# Patient Record
Sex: MALE | Race: WHITE | HISPANIC OR LATINO | Employment: FULL TIME | URBAN - METROPOLITAN AREA
[De-identification: names, ages, dates, MRNs, and addresses within clinical notes are randomized per-mention and may not be internally consistent; named-entity substitution may affect disease eponyms.]

---

## 2017-01-24 ENCOUNTER — ALLSCRIPTS OFFICE VISIT (OUTPATIENT)
Dept: OTHER | Facility: OTHER | Age: 42
End: 2017-01-24

## 2018-01-12 VITALS — SYSTOLIC BLOOD PRESSURE: 138 MMHG | WEIGHT: 223 LBS | BODY MASS INDEX: 31.32 KG/M2 | DIASTOLIC BLOOD PRESSURE: 70 MMHG

## 2019-02-11 ENCOUNTER — TELEPHONE (OUTPATIENT)
Dept: UROLOGY | Facility: MEDICAL CENTER | Age: 44
End: 2019-02-11

## 2019-02-11 NOTE — TELEPHONE ENCOUNTER
Reason for appointment/Complaint/Diagnosis : Mirela Bella 61: [address] [age]  If yes, what kind? N/A    Previous urologist?     no                  Records requested/where?  no    Outside testing/where? no    Location Preference for office visit?  [facility]

## 2019-03-22 ENCOUNTER — OFFICE VISIT (OUTPATIENT)
Dept: UROLOGY | Facility: AMBULATORY SURGERY CENTER | Age: 44
End: 2019-03-22
Payer: COMMERCIAL

## 2019-03-22 VITALS
HEART RATE: 68 BPM | SYSTOLIC BLOOD PRESSURE: 108 MMHG | BODY MASS INDEX: 31.18 KG/M2 | HEIGHT: 72 IN | WEIGHT: 230.2 LBS | DIASTOLIC BLOOD PRESSURE: 70 MMHG

## 2019-03-22 DIAGNOSIS — Z30.2 ENCOUNTER FOR STERILIZATION: Primary | ICD-10-CM

## 2019-03-22 PROCEDURE — 99244 OFF/OP CNSLTJ NEW/EST MOD 40: CPT | Performed by: UROLOGY

## 2019-03-22 RX ORDER — LORAZEPAM 2 MG/1
2 TABLET ORAL
Qty: 1 TABLET | Refills: 0 | Status: SHIPPED | OUTPATIENT
Start: 2019-03-22 | End: 2019-03-22

## 2019-03-22 RX ORDER — DIAZEPAM 5 MG/1
10 TABLET ORAL ONCE
Qty: 1 TABLET | Refills: 0 | Status: SHIPPED | OUTPATIENT
Start: 2019-03-22 | End: 2019-04-29 | Stop reason: ALTCHOICE

## 2019-03-22 NOTE — PROGRESS NOTES
3/22/2019    Tammy Albright  1975  [id number]        Assessment  Elective sterilization    Plan  We discussed our vasectomy packet in detail  He understands the indications, risks, and benefits of the procedure  He understands that this is generally considered a permanent procedure although there is a method for reversal, it is not guaranteed to work and would not be covered by insurance  He understands that he would not be considered sterile until negative semen analysis is obtained post procedure  He further understands that he must rest, ice, and elevate the scrotum for at least 48 hours to avoid complications such as hematoma  Consent was obtained in the office today  All of his questions were answered  He was given a prescription for Valium 10 mg at his request to be taken prior to the visit  History of Present Illness  Tanisha Perera is a 44 y o  male requesting elective sterilization  His wife is pregnant with her fourth child, which was an accidental pregnancy  They both interested in permanent sterilization  She has some GYN issues and did not want to proceed with tubal ligation  Patient denies urologic history, but reports significant anxiety and is concerned about being awake during the procedure  Review of Systems  Review of Systems   Constitutional: Negative  HENT: Negative  Respiratory: Negative  Cardiovascular: Negative  Gastrointestinal: Negative  Genitourinary:        As per HPI   Musculoskeletal: Negative  Skin: Negative  Neurological: Negative  Hematological: Negative  Past Medical History  History reviewed  No pertinent past medical history      Past Social History  Past Surgical History:   Procedure Laterality Date    COLONOSCOPY  2017    WISDOM TOOTH EXTRACTION         Past Family History  Family History   Problem Relation Age of Onset    Diabetes Father     Diabetes Mother        Past Social history  Social History     Socioeconomic History    Marital status: /Civil Union     Spouse name: Not on file    Number of children: Not on file    Years of education: Not on file    Highest education level: Not on file   Occupational History    Not on file   Social Needs    Financial resource strain: Not on file    Food insecurity:     Worry: Not on file     Inability: Not on file    Transportation needs:     Medical: Not on file     Non-medical: Not on file   Tobacco Use    Smoking status: Never Smoker    Smokeless tobacco: Never Used   Substance and Sexual Activity    Alcohol use: Yes     Frequency: 2-4 times a month     Comment: socially saturday     Drug use: Never    Sexual activity: Not on file   Lifestyle    Physical activity:     Days per week: Not on file     Minutes per session: Not on file    Stress: Not on file   Relationships    Social connections:     Talks on phone: Not on file     Gets together: Not on file     Attends Christian service: Not on file     Active member of club or organization: Not on file     Attends meetings of clubs or organizations: Not on file     Relationship status: Not on file    Intimate partner violence:     Fear of current or ex partner: Not on file     Emotionally abused: Not on file     Physically abused: Not on file     Forced sexual activity: Not on file   Other Topics Concern    Not on file   Social History Narrative    Not on file     Social History     Tobacco Use   Smoking Status Never Smoker   Smokeless Tobacco Never Used       Current Medications  Current Outpatient Medications   Medication Sig Dispense Refill    diazepam (VALIUM) 5 mg tablet Take 2 tablets (10 mg total) by mouth once for 1 dose 1 hour prior to procedure 1 tablet 0     No current facility-administered medications for this visit  Allergies  No Known Allergies    Past Medical History, Social History, Family History, medications and allergies were reviewed      Vitals  Vitals:    03/22/19 1509   BP: 108/70 BP Location: Left arm   Patient Position: Sitting   Cuff Size: Adult   Pulse: 68   Weight: 104 kg (230 lb 3 2 oz)   Height: 5' 11 75" (1 822 m)       Physical Exam  Physical Exam   Constitutional: He is oriented to person, place, and time  He appears well-developed and well-nourished  Cardiovascular: Normal rate  Pulmonary/Chest: Effort normal    Abdominal: Soft  Genitourinary:   Genitourinary Comments: Bilateral testes and Vasa palpable and normal    Musculoskeletal: Normal range of motion  Neurological: He is alert and oriented to person, place, and time  Skin: Skin is warm, dry and intact  Psychiatric: He has a normal mood and affect  Vitals reviewed          Results  No results found for: PSA  Lab Results   Component Value Date    GLUCOSE 131 06/18/2014    CALCIUM 9 6 06/18/2014     06/18/2014    K 4 5 06/18/2014    CO2 29 06/18/2014     06/18/2014    BUN 19 06/18/2014    CREATININE 1 41 (H) 06/18/2014     Lab Results   Component Value Date    WBC 6 80 06/18/2014    HGB 16 6 06/18/2014    HCT 49 3 06/18/2014    MCV 92 06/18/2014     06/18/2014

## 2019-04-29 ENCOUNTER — OFFICE VISIT (OUTPATIENT)
Dept: FAMILY MEDICINE CLINIC | Facility: CLINIC | Age: 44
End: 2019-04-29
Payer: COMMERCIAL

## 2019-04-29 VITALS
SYSTOLIC BLOOD PRESSURE: 126 MMHG | BODY MASS INDEX: 30.48 KG/M2 | HEIGHT: 72 IN | TEMPERATURE: 97.6 F | DIASTOLIC BLOOD PRESSURE: 72 MMHG | WEIGHT: 225 LBS

## 2019-04-29 DIAGNOSIS — D49.2 SKIN NEOPLASM: Primary | ICD-10-CM

## 2019-04-29 PROCEDURE — 99213 OFFICE O/P EST LOW 20 MIN: CPT | Performed by: FAMILY MEDICINE

## 2019-04-29 PROCEDURE — 3008F BODY MASS INDEX DOCD: CPT | Performed by: FAMILY MEDICINE

## 2019-05-20 ENCOUNTER — PROCEDURE VISIT (OUTPATIENT)
Dept: UROLOGY | Facility: AMBULATORY SURGERY CENTER | Age: 44
End: 2019-05-20
Payer: COMMERCIAL

## 2019-05-20 VITALS
HEIGHT: 72 IN | HEART RATE: 80 BPM | SYSTOLIC BLOOD PRESSURE: 102 MMHG | WEIGHT: 219 LBS | DIASTOLIC BLOOD PRESSURE: 80 MMHG | BODY MASS INDEX: 29.66 KG/M2

## 2019-05-20 DIAGNOSIS — Z30.2 ENCOUNTER FOR STERILIZATION: Primary | ICD-10-CM

## 2019-05-20 PROCEDURE — 88302 TISSUE EXAM BY PATHOLOGIST: CPT | Performed by: PATHOLOGY

## 2019-05-20 PROCEDURE — 55250 REMOVAL OF SPERM DUCT(S): CPT | Performed by: UROLOGY

## 2019-05-20 RX ORDER — HYDROCODONE BITARTRATE AND ACETAMINOPHEN 5; 325 MG/1; MG/1
1 TABLET ORAL EVERY 6 HOURS PRN
Qty: 5 TABLET | Refills: 0 | Status: SHIPPED | OUTPATIENT
Start: 2019-05-20 | End: 2019-09-25 | Stop reason: ALTCHOICE

## 2019-06-03 ENCOUNTER — OFFICE VISIT (OUTPATIENT)
Dept: UROLOGY | Facility: AMBULATORY SURGERY CENTER | Age: 44
End: 2019-06-03

## 2019-06-03 VITALS
HEART RATE: 88 BPM | HEIGHT: 72 IN | WEIGHT: 221 LBS | DIASTOLIC BLOOD PRESSURE: 74 MMHG | SYSTOLIC BLOOD PRESSURE: 106 MMHG | BODY MASS INDEX: 29.93 KG/M2

## 2019-06-03 DIAGNOSIS — Z98.52 STATUS POST VASECTOMY: Primary | ICD-10-CM

## 2019-06-03 PROCEDURE — 99024 POSTOP FOLLOW-UP VISIT: CPT | Performed by: NURSE PRACTITIONER

## 2019-09-25 ENCOUNTER — OFFICE VISIT (OUTPATIENT)
Dept: FAMILY MEDICINE CLINIC | Facility: CLINIC | Age: 44
End: 2019-09-25
Payer: COMMERCIAL

## 2019-09-25 VITALS
WEIGHT: 225 LBS | HEART RATE: 89 BPM | SYSTOLIC BLOOD PRESSURE: 126 MMHG | TEMPERATURE: 97.4 F | DIASTOLIC BLOOD PRESSURE: 74 MMHG | BODY MASS INDEX: 30.48 KG/M2 | HEIGHT: 72 IN

## 2019-09-25 DIAGNOSIS — Z00.00 WELL ADULT EXAM: Primary | ICD-10-CM

## 2019-09-25 PROCEDURE — 86580 TB INTRADERMAL TEST: CPT

## 2019-09-25 PROCEDURE — 90715 TDAP VACCINE 7 YRS/> IM: CPT

## 2019-09-25 PROCEDURE — 90471 IMMUNIZATION ADMIN: CPT

## 2019-09-25 PROCEDURE — 99396 PREV VISIT EST AGE 40-64: CPT | Performed by: FAMILY MEDICINE

## 2019-09-25 NOTE — PROGRESS NOTES
Assessment/Plan:  Recommend laboratory studies  Patient will have Adacel shot at this time  Patient up-to-date with colonoscopy  Follow-up yearly       Diagnoses and all orders for this visit:    Well adult exam  -     CBC and differential; Future  -     Comprehensive metabolic panel; Future  -     Lipid panel; Future  -     TSH, 3rd generation with Free T4 reflex; Future            Subjective:        Patient ID: Nnamdi Amado is a 38 y o  male  Patient is here for wellness exam   Patient will need PPD for work  Patient had colonoscopy roughly 5 years ago  No chest pain or shortness of breath problems urinating defecating  No headaches or visual disturbance  No other significant complaints  The following portions of the patient's history were reviewed and updated as appropriate: allergies, current medications, past family history, past medical history, past social history, past surgical history and problem list       Review of Systems   Constitutional: Negative  HENT: Negative  Eyes: Negative  Respiratory: Negative  Cardiovascular: Negative  Gastrointestinal: Negative  Endocrine: Negative  Genitourinary: Negative  Musculoskeletal: Negative  Skin: Negative  Allergic/Immunologic: Negative  Neurological: Negative  Hematological: Negative  Psychiatric/Behavioral: Negative  Objective:      BMI Counseling: Body mass index is 30 73 kg/m²  Discussed the patient's BMI with him  The BMI is above normal  Nutrition recommendations include decreasing overall calorie intake  /74 (BP Location: Right arm)   Pulse 89   Temp (!) 97 4 °F (36 3 °C)   Ht 5' 11 75" (1 822 m)   Wt 102 kg (225 lb)   BMI 30 73 kg/m²          Physical Exam   Constitutional: He is oriented to person, place, and time  He appears well-developed and well-nourished  No distress  HENT:   Head: Normocephalic     Right Ear: External ear normal    Left Ear: External ear normal  Mouth/Throat: Oropharynx is clear and moist  No oropharyngeal exudate  Eyes: Pupils are equal, round, and reactive to light  EOM are normal  Right eye exhibits no discharge  Left eye exhibits no discharge  No scleral icterus  Neck: Normal range of motion  Neck supple  No thyromegaly present  Cardiovascular: Normal rate, regular rhythm, normal heart sounds and intact distal pulses  Exam reveals no gallop and no friction rub  No murmur heard  Pulmonary/Chest: Effort normal and breath sounds normal  No respiratory distress  He has no wheezes  He has no rales  He exhibits no tenderness  Abdominal: Soft  Bowel sounds are normal  He exhibits no distension  There is no tenderness  There is no rebound and no guarding  Musculoskeletal: Normal range of motion  He exhibits no edema or tenderness  Lymphadenopathy:     He has no cervical adenopathy  Neurological: He is oriented to person, place, and time  No cranial nerve deficit  He exhibits normal muscle tone  Coordination normal    Skin: Skin is warm and dry  No rash noted  He is not diaphoretic  No erythema  No pallor  Psychiatric: He has a normal mood and affect  His behavior is normal  Judgment and thought content normal    Nursing note and vitals reviewed

## 2019-09-27 ENCOUNTER — APPOINTMENT (OUTPATIENT)
Dept: RADIOLOGY | Facility: MEDICAL CENTER | Age: 44
End: 2019-09-27
Payer: COMMERCIAL

## 2019-09-27 ENCOUNTER — CLINICAL SUPPORT (OUTPATIENT)
Dept: FAMILY MEDICINE CLINIC | Facility: CLINIC | Age: 44
End: 2019-09-27

## 2019-09-27 DIAGNOSIS — R76.11 POSITIVE PPD: ICD-10-CM

## 2019-09-27 DIAGNOSIS — R76.11 POSITIVE PPD: Primary | ICD-10-CM

## 2019-09-27 LAB
INDURATION: ABNORMAL MM
TB SKIN TEST: POSITIVE

## 2019-09-27 PROCEDURE — 71046 X-RAY EXAM CHEST 2 VIEWS: CPT

## 2019-10-10 ENCOUNTER — APPOINTMENT (OUTPATIENT)
Dept: LAB | Facility: OTHER | Age: 44
End: 2019-10-10
Payer: COMMERCIAL

## 2019-10-10 ENCOUNTER — TRANSCRIBE ORDERS (OUTPATIENT)
Dept: LAB | Facility: OTHER | Age: 44
End: 2019-10-10

## 2019-10-10 DIAGNOSIS — Z00.00 WELL ADULT EXAM: ICD-10-CM

## 2019-10-10 LAB
ALBUMIN SERPL BCP-MCNC: 4.7 G/DL (ref 3.5–5)
ALP SERPL-CCNC: 105 U/L (ref 46–116)
ALT SERPL W P-5'-P-CCNC: 36 U/L (ref 12–78)
ANION GAP SERPL CALCULATED.3IONS-SCNC: 7 MMOL/L (ref 4–13)
AST SERPL W P-5'-P-CCNC: 18 U/L (ref 5–45)
BASOPHILS # BLD AUTO: 0.05 THOUSANDS/ΜL (ref 0–0.1)
BASOPHILS NFR BLD AUTO: 1 % (ref 0–1)
BILIRUB SERPL-MCNC: 0.51 MG/DL (ref 0.2–1)
BUN SERPL-MCNC: 20 MG/DL (ref 5–25)
CALCIUM SERPL-MCNC: 8.8 MG/DL (ref 8.3–10.1)
CHLORIDE SERPL-SCNC: 107 MMOL/L (ref 100–108)
CHOLEST SERPL-MCNC: 192 MG/DL (ref 50–200)
CO2 SERPL-SCNC: 25 MMOL/L (ref 21–32)
CREAT SERPL-MCNC: 1.41 MG/DL (ref 0.6–1.3)
EOSINOPHIL # BLD AUTO: 0.15 THOUSAND/ΜL (ref 0–0.61)
EOSINOPHIL NFR BLD AUTO: 2 % (ref 0–6)
ERYTHROCYTE [DISTWIDTH] IN BLOOD BY AUTOMATED COUNT: 12.5 % (ref 11.6–15.1)
GFR SERPL CREATININE-BSD FRML MDRD: 61 ML/MIN/1.73SQ M
GLUCOSE P FAST SERPL-MCNC: 92 MG/DL (ref 65–99)
HCT VFR BLD AUTO: 50.2 % (ref 36.5–49.3)
HDLC SERPL-MCNC: 29 MG/DL (ref 40–60)
HGB BLD-MCNC: 16.8 G/DL (ref 12–17)
IMM GRANULOCYTES # BLD AUTO: 0.06 THOUSAND/UL (ref 0–0.2)
IMM GRANULOCYTES NFR BLD AUTO: 1 % (ref 0–2)
LYMPHOCYTES # BLD AUTO: 1.77 THOUSANDS/ΜL (ref 0.6–4.47)
LYMPHOCYTES NFR BLD AUTO: 25 % (ref 14–44)
MCH RBC QN AUTO: 30.2 PG (ref 26.8–34.3)
MCHC RBC AUTO-ENTMCNC: 33.5 G/DL (ref 31.4–37.4)
MCV RBC AUTO: 90 FL (ref 82–98)
MONOCYTES # BLD AUTO: 0.69 THOUSAND/ΜL (ref 0.17–1.22)
MONOCYTES NFR BLD AUTO: 10 % (ref 4–12)
NEUTROPHILS # BLD AUTO: 4.47 THOUSANDS/ΜL (ref 1.85–7.62)
NEUTS SEG NFR BLD AUTO: 61 % (ref 43–75)
NONHDLC SERPL-MCNC: 163 MG/DL
NRBC BLD AUTO-RTO: 0 /100 WBCS
PLATELET # BLD AUTO: 191 THOUSANDS/UL (ref 149–390)
PMV BLD AUTO: 12.7 FL (ref 8.9–12.7)
POTASSIUM SERPL-SCNC: 4.3 MMOL/L (ref 3.5–5.3)
PROT SERPL-MCNC: 8 G/DL (ref 6.4–8.2)
RBC # BLD AUTO: 5.56 MILLION/UL (ref 3.88–5.62)
SODIUM SERPL-SCNC: 139 MMOL/L (ref 136–145)
TRIGL SERPL-MCNC: 461 MG/DL
TSH SERPL DL<=0.05 MIU/L-ACNC: 2.24 UIU/ML (ref 0.36–3.74)
WBC # BLD AUTO: 7.19 THOUSAND/UL (ref 4.31–10.16)

## 2019-10-10 PROCEDURE — 84443 ASSAY THYROID STIM HORMONE: CPT

## 2019-10-10 PROCEDURE — 80053 COMPREHEN METABOLIC PANEL: CPT

## 2019-10-10 PROCEDURE — 85025 COMPLETE CBC W/AUTO DIFF WBC: CPT

## 2019-10-10 PROCEDURE — 36415 COLL VENOUS BLD VENIPUNCTURE: CPT

## 2019-10-10 PROCEDURE — 80061 LIPID PANEL: CPT

## 2019-10-18 ENCOUNTER — OFFICE VISIT (OUTPATIENT)
Dept: FAMILY MEDICINE CLINIC | Facility: CLINIC | Age: 44
End: 2019-10-18
Payer: COMMERCIAL

## 2019-10-18 VITALS
DIASTOLIC BLOOD PRESSURE: 68 MMHG | HEIGHT: 72 IN | WEIGHT: 225 LBS | BODY MASS INDEX: 30.48 KG/M2 | SYSTOLIC BLOOD PRESSURE: 124 MMHG

## 2019-10-18 DIAGNOSIS — E78.1 HYPERTRIGLYCERIDEMIA: Primary | ICD-10-CM

## 2019-10-18 PROCEDURE — 99213 OFFICE O/P EST LOW 20 MIN: CPT | Performed by: FAMILY MEDICINE

## 2019-10-18 PROCEDURE — 3008F BODY MASS INDEX DOCD: CPT | Performed by: FAMILY MEDICINE

## 2019-10-18 NOTE — PROGRESS NOTES
Assessment/Plan:  Labs discussed with the patient  Patient will modify diet  Patient will exercise  Patient have labs prior to next visit in 6 months       Diagnoses and all orders for this visit:    Hypertriglyceridemia  -     Lipid panel; Future            Subjective:        Patient ID: Sarah Briceño is a 38 y o  male  Patient follow-up on laboratory results  Patient did have TB in the past as a child  A recent chest x-ray negative  No cough, sputum production, hemoptysis weight loss fatigue the  No chest pain or shortness of breath  The following portions of the patient's history were reviewed and updated as appropriate: allergies, current medications, past family history, past medical history, past social history, past surgical history and problem list       Review of Systems   Constitutional: Negative  HENT: Negative  Eyes: Negative  Respiratory: Negative  Cardiovascular: Negative  Gastrointestinal: Negative  Endocrine: Negative  Genitourinary: Negative  Musculoskeletal: Negative  Skin: Negative  Allergic/Immunologic: Negative  Neurological: Negative  Hematological: Negative  Psychiatric/Behavioral: Negative  Objective:      BMI Counseling: Body mass index is 30 73 kg/m²  Discussed the patient's BMI with him  The BMI is above normal  Nutrition recommendations include reducing portion sizes  /68 (BP Location: Right arm)   Ht 5' 11 75" (1 822 m)   Wt 102 kg (225 lb)   BMI 30 73 kg/m²          Physical Exam   Constitutional: He appears well-developed and well-nourished  Cardiovascular: Normal rate, regular rhythm and normal heart sounds  Pulmonary/Chest: Effort normal and breath sounds normal    Neurological: He is alert  Nursing note and vitals reviewed

## 2021-05-19 ENCOUNTER — OFFICE VISIT (OUTPATIENT)
Dept: FAMILY MEDICINE CLINIC | Facility: CLINIC | Age: 46
End: 2021-05-19
Payer: COMMERCIAL

## 2021-05-19 VITALS
BODY MASS INDEX: 28.84 KG/M2 | WEIGHT: 206 LBS | DIASTOLIC BLOOD PRESSURE: 84 MMHG | TEMPERATURE: 97.4 F | HEIGHT: 71 IN | SYSTOLIC BLOOD PRESSURE: 124 MMHG
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
## 2021-05-19 DIAGNOSIS — Z00.00 WELL ADULT EXAM: Primary | ICD-10-CM

## 2021-05-19 PROCEDURE — 3008F BODY MASS INDEX DOCD: CPT | Performed by: FAMILY MEDICINE

## 2021-05-19 PROCEDURE — 1036F TOBACCO NON-USER: CPT | Performed by: FAMILY MEDICINE

## 2021-05-19 PROCEDURE — 3725F SCREEN DEPRESSION PERFORMED: CPT | Performed by: FAMILY MEDICINE

## 2021-05-19 PROCEDURE — 99396 PREV VISIT EST AGE 40-64: CPT | Performed by: FAMILY MEDICINE

## 2021-05-19 RX ORDER — CHLORAL HYDRATE 500 MG
4000 CAPSULE ORAL DAILY
COMMUNITY

## 2021-05-19 NOTE — PROGRESS NOTES
Assessment/Plan: guidance given overall  Since tick was removed in less than 24 hours will observe and do laboratory studies in the next month for Lyme titer  Guidance given regarding symptoms of Lyme disease  Patient have other laboratory studies done at that time  Patient will continue modify diet appropriately continue with fish oil 4 g daily  And other things for screening are up-to-date follow-up yearly or as needed       Diagnoses and all orders for this visit:    Well adult exam  -     CBC and differential; Future  -     Comprehensive metabolic panel; Future  -     Lipid panel; Future  -     TSH, 3rd generation with Free T4 reflex; Future    Tick bite, initial encounter  -     Lyme Antibody Profile with reflex to WB; Future    Other orders  -     Omega-3 Fatty Acids (fish oil) 1,000 mg; Take 4,000 mg by mouth daily  -     Multiple Vitamins-Minerals (MENS MULTI VITAMIN & MINERAL PO); Take by mouth            Subjective:        Patient ID: Esequiel Lesches is a 41 y o  male  Patient is here for wellness exam   Patient has had colonoscopy in the past with Dr Kendra Devries  This was roughly 10 years ago  No rectal bleeding recently  No early family history of prostate cancer  Vaccines and labs reviewed  Patient status post tick bite  The following portions of the patient's history were reviewed and updated as appropriate: allergies, current medications, past family history, past medical history, past social history, past surgical history and problem list       Review of Systems   Constitutional: Negative  HENT: Negative  Eyes: Negative  Respiratory: Negative  Cardiovascular: Negative  Gastrointestinal: Negative  Endocrine: Negative  Genitourinary: Negative  Musculoskeletal: Negative  Skin: Positive for color change  Allergic/Immunologic: Negative  Neurological: Negative  Hematological: Negative  Psychiatric/Behavioral: Negative              Objective:      BMI Counseling: Body mass index is 28 93 kg/m²  The BMI is above normal  Nutrition recommendations include decreasing portion sizes  Exercise recommendations include moderate physical activity 150 minutes/week  Depression Screening and Follow-up Plan: Patient's depression screening was positive with a PHQ-2 score of 0  Clincally patient does not have depression  No treatment is required  /84 (BP Location: Right arm, Patient Position: Sitting, Cuff Size: Adult)   Temp (!) 97 4 °F (36 3 °C) (Tympanic)   Ht 5' 10 75" (1 797 m)   Wt 93 4 kg (206 lb)   BMI 28 93 kg/m²          Physical Exam  Vitals signs and nursing note reviewed  Constitutional:       General: He is not in acute distress  Appearance: Normal appearance  He is not ill-appearing, toxic-appearing or diaphoretic  HENT:      Head: Normocephalic and atraumatic  Right Ear: Tympanic membrane, ear canal and external ear normal  There is no impacted cerumen  Left Ear: Tympanic membrane, ear canal and external ear normal  There is no impacted cerumen  Nose: Nose normal  No congestion or rhinorrhea  Mouth/Throat:      Mouth: Mucous membranes are moist       Pharynx: No oropharyngeal exudate or posterior oropharyngeal erythema  Eyes:      General: No scleral icterus  Right eye: No discharge  Left eye: No discharge  Extraocular Movements: Extraocular movements intact  Conjunctiva/sclera: Conjunctivae normal       Pupils: Pupils are equal, round, and reactive to light  Neck:      Musculoskeletal: Normal range of motion and neck supple  No neck rigidity or muscular tenderness  Vascular: No carotid bruit  Cardiovascular:      Rate and Rhythm: Normal rate and regular rhythm  Pulses: Normal pulses  Heart sounds: Normal heart sounds  No murmur  No friction rub  No gallop  Pulmonary:      Effort: Pulmonary effort is normal  No respiratory distress        Breath sounds: Normal breath sounds  No stridor  No wheezing, rhonchi or rales  Chest:      Chest wall: No tenderness  Abdominal:      General: Abdomen is flat  Bowel sounds are normal  There is no distension  Palpations: Abdomen is soft  Tenderness: There is no abdominal tenderness  There is no guarding or rebound  Musculoskeletal: Normal range of motion  General: No swelling, tenderness, deformity or signs of injury  Right lower leg: No edema  Left lower leg: No edema  Lymphadenopathy:      Cervical: No cervical adenopathy  Skin:     General: Skin is warm and dry  Capillary Refill: Capillary refill takes less than 2 seconds  Coloration: Skin is not jaundiced  Findings: No bruising, erythema, lesion or rash  Comments:  remnant of tick bite right lower lumbar region  No E cm rash   Neurological:      Mental Status: He is alert and oriented to person, place, and time  Mental status is at baseline  Cranial Nerves: No cranial nerve deficit  Sensory: No sensory deficit  Motor: No weakness  Coordination: Coordination normal       Gait: Gait normal    Psychiatric:         Mood and Affect: Mood normal          Behavior: Behavior normal          Thought Content:  Thought content normal          Judgment: Judgment normal

## 2021-12-22 ENCOUNTER — APPOINTMENT (OUTPATIENT)
Dept: LAB | Facility: IMAGING CENTER | Age: 46
End: 2021-12-22
Payer: COMMERCIAL

## 2021-12-22 DIAGNOSIS — Z00.00 WELL ADULT EXAM: ICD-10-CM
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
## 2021-12-22 LAB
ALBUMIN SERPL BCP-MCNC: 4.2 G/DL (ref 3.5–5)
ALP SERPL-CCNC: 103 U/L (ref 46–116)
ALT SERPL W P-5'-P-CCNC: 32 U/L (ref 12–78)
ANION GAP SERPL CALCULATED.3IONS-SCNC: 3 MMOL/L (ref 4–13)
AST SERPL W P-5'-P-CCNC: 19 U/L (ref 5–45)
BASOPHILS # BLD AUTO: 0.06 THOUSANDS/ΜL (ref 0–0.1)
BASOPHILS NFR BLD AUTO: 1 % (ref 0–1)
BILIRUB SERPL-MCNC: 0.6 MG/DL (ref 0.2–1)
BUN SERPL-MCNC: 15 MG/DL (ref 5–25)
CALCIUM SERPL-MCNC: 9.2 MG/DL (ref 8.3–10.1)
CHLORIDE SERPL-SCNC: 105 MMOL/L (ref 100–108)
CHOLEST SERPL-MCNC: 184 MG/DL
CO2 SERPL-SCNC: 29 MMOL/L (ref 21–32)
CREAT SERPL-MCNC: 1.42 MG/DL (ref 0.6–1.3)
EOSINOPHIL # BLD AUTO: 0.19 THOUSAND/ΜL (ref 0–0.61)
EOSINOPHIL NFR BLD AUTO: 2 % (ref 0–6)
ERYTHROCYTE [DISTWIDTH] IN BLOOD BY AUTOMATED COUNT: 12.1 % (ref 11.6–15.1)
GFR SERPL CREATININE-BSD FRML MDRD: 59 ML/MIN/1.73SQ M
GLUCOSE P FAST SERPL-MCNC: 88 MG/DL (ref 65–99)
HCT VFR BLD AUTO: 49.1 % (ref 36.5–49.3)
HDLC SERPL-MCNC: 37 MG/DL
HGB BLD-MCNC: 16.4 G/DL (ref 12–17)
IMM GRANULOCYTES # BLD AUTO: 0.05 THOUSAND/UL (ref 0–0.2)
IMM GRANULOCYTES NFR BLD AUTO: 1 % (ref 0–2)
LDLC SERPL CALC-MCNC: 68 MG/DL (ref 0–100)
LYMPHOCYTES # BLD AUTO: 1.44 THOUSANDS/ΜL (ref 0.6–4.47)
LYMPHOCYTES NFR BLD AUTO: 15 % (ref 14–44)
MCH RBC QN AUTO: 30.3 PG (ref 26.8–34.3)
MCHC RBC AUTO-ENTMCNC: 33.4 G/DL (ref 31.4–37.4)
MCV RBC AUTO: 91 FL (ref 82–98)
MONOCYTES # BLD AUTO: 0.92 THOUSAND/ΜL (ref 0.17–1.22)
MONOCYTES NFR BLD AUTO: 10 % (ref 4–12)
NEUTROPHILS # BLD AUTO: 6.99 THOUSANDS/ΜL (ref 1.85–7.62)
NEUTS SEG NFR BLD AUTO: 71 % (ref 43–75)
NONHDLC SERPL-MCNC: 147 MG/DL
NRBC BLD AUTO-RTO: 0 /100 WBCS
PLATELET # BLD AUTO: 209 THOUSANDS/UL (ref 149–390)
PMV BLD AUTO: 11.9 FL (ref 8.9–12.7)
POTASSIUM SERPL-SCNC: 4.5 MMOL/L (ref 3.5–5.3)
PROT SERPL-MCNC: 7.8 G/DL (ref 6.4–8.2)
RBC # BLD AUTO: 5.41 MILLION/UL (ref 3.88–5.62)
SODIUM SERPL-SCNC: 137 MMOL/L (ref 136–145)
TRIGL SERPL-MCNC: 396 MG/DL
TSH SERPL DL<=0.05 MIU/L-ACNC: 2.45 UIU/ML (ref 0.36–3.74)
WBC # BLD AUTO: 9.65 THOUSAND/UL (ref 4.31–10.16)

## 2021-12-22 PROCEDURE — 84443 ASSAY THYROID STIM HORMONE: CPT

## 2021-12-22 PROCEDURE — 86618 LYME DISEASE ANTIBODY: CPT

## 2021-12-22 PROCEDURE — 85025 COMPLETE CBC W/AUTO DIFF WBC: CPT

## 2021-12-22 PROCEDURE — 36415 COLL VENOUS BLD VENIPUNCTURE: CPT

## 2021-12-22 PROCEDURE — 80053 COMPREHEN METABOLIC PANEL: CPT

## 2021-12-22 PROCEDURE — 80061 LIPID PANEL: CPT

## 2021-12-23 ENCOUNTER — APPOINTMENT (OUTPATIENT)
Dept: RADIOLOGY | Facility: MEDICAL CENTER | Age: 46
End: 2021-12-23
Payer: COMMERCIAL

## 2021-12-23 ENCOUNTER — OFFICE VISIT (OUTPATIENT)
Dept: URGENT CARE | Facility: MEDICAL CENTER | Age: 46
End: 2021-12-23
Payer: COMMERCIAL

## 2021-12-23 VITALS
RESPIRATION RATE: 16 BRPM | TEMPERATURE: 98.2 F | HEART RATE: 93 BPM | WEIGHT: 215 LBS | HEIGHT: 72 IN | BODY MASS INDEX: 29.12 KG/M2 | OXYGEN SATURATION: 96 %

## 2021-12-23 DIAGNOSIS — S86.012A STRAIN OF LEFT ACHILLES TENDON, INITIAL ENCOUNTER: ICD-10-CM

## 2021-12-23 DIAGNOSIS — S93.402A SPRAIN OF LEFT ANKLE, UNSPECIFIED LIGAMENT, INITIAL ENCOUNTER: Primary | ICD-10-CM

## 2021-12-23 DIAGNOSIS — S93.402A SPRAIN OF LEFT ANKLE, UNSPECIFIED LIGAMENT, INITIAL ENCOUNTER: ICD-10-CM

## 2021-12-23 LAB — B BURGDOR IGG+IGM SER-ACNC: 51

## 2021-12-23 PROCEDURE — 73630 X-RAY EXAM OF FOOT: CPT

## 2021-12-23 PROCEDURE — 73610 X-RAY EXAM OF ANKLE: CPT

## 2021-12-23 PROCEDURE — G0382 LEV 3 HOSP TYPE B ED VISIT: HCPCS | Performed by: PHYSICIAN ASSISTANT

## 2021-12-23 RX ORDER — IBUPROFEN 600 MG/1
600 TABLET ORAL EVERY 6 HOURS PRN
Qty: 30 TABLET | Refills: 0 | Status: SHIPPED | OUTPATIENT
Start: 2021-12-23

## 2022-01-06 ENCOUNTER — TELEPHONE (OUTPATIENT)
Dept: FAMILY MEDICINE CLINIC | Facility: CLINIC | Age: 47
End: 2022-01-06

## 2022-01-06 DIAGNOSIS — M25.40 JOINT SWELLING: ICD-10-CM

## 2022-01-06 DIAGNOSIS — M25.50 ARTHRALGIA, UNSPECIFIED JOINT: Primary | ICD-10-CM

## 2022-01-06 NOTE — TELEPHONE ENCOUNTER
Patient was seen in May for tick removal  He had tested negative for Lyme disease and did not have much joint pain at time  Now he complains of worsening shifting of joint pain and swelling over the past month  He has taken ibuprofen with little pain  Pain level right now is 8  He denies any fever or other sxs  He mentioned that another medication was offered at time of visit  Please advise  Thank you

## 2022-01-07 RX ORDER — DOXYCYCLINE HYCLATE 100 MG/1
100 CAPSULE ORAL EVERY 12 HOURS SCHEDULED
Qty: 60 CAPSULE | Refills: 0 | Status: SHIPPED | OUTPATIENT
Start: 2022-01-07 | End: 2022-02-06

## 2023-01-27 ENCOUNTER — TELEPHONE (OUTPATIENT)
Dept: UROLOGY | Facility: AMBULATORY SURGERY CENTER | Age: 48
End: 2023-01-27

## 2023-01-27 NOTE — TELEPHONE ENCOUNTER
Patient has a VAS done on 3/22/19 with Dr Fernando Abdul in [place]  Patient never went for his semen analysis and the orders are discontinued and he is wanting to know how he goes about getting new orders      Patient requesting a call back at [phone]

## 2023-02-13 ENCOUNTER — OFFICE VISIT (OUTPATIENT)
Dept: FAMILY MEDICINE CLINIC | Facility: CLINIC | Age: 48
End: 2023-02-13

## 2023-02-13 VITALS
BODY MASS INDEX: 31.19 KG/M2 | TEMPERATURE: 98 F | OXYGEN SATURATION: 97 % | SYSTOLIC BLOOD PRESSURE: 120 MMHG | DIASTOLIC BLOOD PRESSURE: 88 MMHG | HEIGHT: 71 IN | WEIGHT: 222.8 LBS | HEART RATE: 90 BPM

## 2023-02-13 DIAGNOSIS — Z12.11 ENCOUNTER FOR COLORECTAL CANCER SCREENING: ICD-10-CM

## 2023-02-13 DIAGNOSIS — Z98.52 H/O: VASECTOMY: ICD-10-CM

## 2023-02-13 DIAGNOSIS — Z00.00 WELL ADULT EXAM: Primary | ICD-10-CM

## 2023-02-13 DIAGNOSIS — Z12.12 ENCOUNTER FOR COLORECTAL CANCER SCREENING: ICD-10-CM

## 2023-02-13 DIAGNOSIS — R07.89 OTHER CHEST PAIN: ICD-10-CM

## 2023-02-13 NOTE — PROGRESS NOTES
Assessment/Plan: Patient go for laboratory studies  Patient will see Dr Olga Torres for colorectal screening  Vaccines up-to-date  No family history of prostate cancer  Patient will be referred to urology for vasectomy check  Patient will see cardiology appropriately  Diagnoses and all orders for this visit:    Well adult exam  -     CBC and differential; Future  -     Comprehensive metabolic panel; Future  -     Lipid panel; Future  -     TSH, 3rd generation with Free T4 reflex; Future    Encounter for colorectal cancer screening  -     Ambulatory Referral to Colorectal Surgery; Future    H/O: vasectomy  -     Ambulatory Referral to Urology; Future    Other chest pain  -     Ambulatory Referral to Cardiology; Future            Subjective:        Patient ID: Pk Ours is a 56 y o  male  Patient is here for wellness exam   Labs ordered and vaccines reviewed  Patient is due for colonoscopy  No family history of prostate cancer  Patient feeling well overall  Patient does notice some chest pain and shortness of breath intermittently  The following portions of the patient's history were reviewed and updated as appropriate: allergies, current medications, past family history, past medical history, past social history, past surgical history and problem list       Review of Systems   Constitutional: Negative  HENT: Negative  Eyes: Negative  Respiratory: Positive for shortness of breath  Cardiovascular: Positive for chest pain  Gastrointestinal: Negative  Endocrine: Negative  Genitourinary: Negative  Musculoskeletal: Negative  Skin: Negative  Allergic/Immunologic: Negative  Neurological: Negative  Hematological: Negative  Psychiatric/Behavioral: Negative  Objective:      BMI Counseling: Body mass index is 31 29 kg/m²  The BMI is above normal  Nutrition recommendations include consuming healthier snacks   Exercise recommendations include moderate physical activity 150 minutes/week  Rationale for BMI follow-up plan is due to patient being overweight or obese  Depression Screening and Follow-up Plan: Patient was screened for depression during today's encounter  They screened negative with a PHQ-2 score of 0             /88 (BP Location: Right arm, Patient Position: Sitting, Cuff Size: Standard)   Pulse 90   Temp 98 °F (36 7 °C) (Temporal)   Ht 5' 10 75" (1 797 m)   Wt 101 kg (222 lb 12 8 oz)   SpO2 97%   BMI 31 29 kg/m²          Physical Exam  Vitals and nursing note reviewed  Constitutional:       General: He is not in acute distress  Appearance: Normal appearance  He is not ill-appearing, toxic-appearing or diaphoretic  HENT:      Head: Normocephalic and atraumatic  Right Ear: Tympanic membrane, ear canal and external ear normal  There is no impacted cerumen  Left Ear: Tympanic membrane, ear canal and external ear normal  There is no impacted cerumen  Nose: Nose normal  No congestion or rhinorrhea  Mouth/Throat:      Mouth: Mucous membranes are moist       Pharynx: No oropharyngeal exudate or posterior oropharyngeal erythema  Eyes:      General: No scleral icterus  Right eye: No discharge  Left eye: No discharge  Extraocular Movements: Extraocular movements intact  Conjunctiva/sclera: Conjunctivae normal       Pupils: Pupils are equal, round, and reactive to light  Neck:      Vascular: No carotid bruit  Cardiovascular:      Rate and Rhythm: Normal rate and regular rhythm  Pulses: Normal pulses  Heart sounds: Normal heart sounds  No murmur heard  No friction rub  No gallop  Pulmonary:      Effort: Pulmonary effort is normal  No respiratory distress  Breath sounds: Normal breath sounds  No stridor  No wheezing, rhonchi or rales  Chest:      Chest wall: No tenderness  Musculoskeletal:         General: No swelling, tenderness, deformity or signs of injury   Normal range of motion  Cervical back: Normal range of motion and neck supple  No rigidity  No muscular tenderness  Right lower leg: No edema  Left lower leg: No edema  Lymphadenopathy:      Cervical: No cervical adenopathy  Skin:     General: Skin is warm and dry  Capillary Refill: Capillary refill takes less than 2 seconds  Coloration: Skin is not jaundiced  Findings: No bruising, erythema, lesion or rash  Neurological:      Mental Status: He is alert and oriented to person, place, and time  Mental status is at baseline  Cranial Nerves: No cranial nerve deficit  Sensory: No sensory deficit  Motor: No weakness  Coordination: Coordination normal       Gait: Gait normal    Psychiatric:         Mood and Affect: Mood normal          Behavior: Behavior normal          Thought Content:  Thought content normal          Judgment: Judgment normal

## 2023-02-28 RX ORDER — LORAZEPAM 2 MG/1
2 TABLET ORAL
Qty: 1 TABLET | Refills: 0 | Status: CANCELLED | OUTPATIENT
Start: 2023-02-28

## 2023-02-28 NOTE — PROGRESS NOTES
3/2/2023      No chief complaint on file  Assessment and Plan    56 y o  male managed by new patient    1  Desire for elective sterilization  - exam today as below  - informed consent signed today  - continue contraception  - rx ativan with  to/from on appt date  - shave scrotal/pubic hair day prior to appt date    Return for vasectomy as scheduled  History of Present Illness  Kya Miranda is a 56 y o  male here for evaluation of VASECTOMY CONSULT    History of genitourinary or groin trauma or surgery-  Fathered children-  Personal and/or mutual desire for permanent sterility-  Current contraceptive method-  Work/manual labor/lifting-  Voiding issues- none  Bleeding issues/thinners- none  Allergies to lidocaine/marcaine/betadine/chromic- none    The patient presents requesting elective sterilization vasectomy  We discussed that vasectomy is in operation performed in the office in order to provide elective sterilization  This procedure should be considered a permanent option  Although there are subspecialists who perform vasectomy reversals, these operations are not 100% successful and are often not covered by insurance meaning they can come with a large out-of-pocket cost  The patient understands this  We reviewed the procedure in depth  Risk and benefits of the procedure were discussed and reviewed  Informed consent was obtained in the office today  The patient was prescribed a benzodiazepine to take one hour prior to the procedure to assist with his comfort  He understands that he will require transportation to and from the office that day if he is to use the benzodiazepine  He also understands he will require semen analysis testing at 8 weeks post procedure to ensure full sterilization  In the interim, he will require contraception during intercourse to avoid an undesired pregnancy  Usually, patients are out of work for 2-3 days   We recommend tight fitting scrotal support following the procedure along with ice packs applied to the scrotum 15 minutes on and 15 minutes off for the first 24 hours  We discussed that we do send the patient home with short course of anti-inflammatory and/or narcotic pain medication  After this discussion, the patient agrees to proceed  We will schedule him in the near future  He agrees to take oral sedative - ativan 2mg one hour prior to procedure  Review of Systems             Vitals  There were no vitals filed for this visit  Physical Exam    General: Well appearing, no distress, appears stated age  HEENT:  Normocephalic, atraumatic  Conjunctiva clear  Respiratory: Nonlabored respirations, no wheeze or cough  Abdomen:  Soft nontender without hernia  No suprapubic or CVA tenderness  Genitourinary: Circumcised penis, normal phallus, orthotopic patent meatus  Testes smooth descended bilaterally into the scrotum nontender with no palpable mass  Palpably normal spermatic cord and vas deferens bilaterally  Musculoskeletal:  Normal range of motion and gait without defecit  Neuro: No gross neurologic defect or abnormality  Steady unassisted gait  Speech and affect normal   Dermatologic: skin warm, dry; no rash erythema or ecchymosis      Past History  No past medical history on file    Social History     Socioeconomic History   • Marital status: /Civil Union     Spouse name: Not on file   • Number of children: Not on file   • Years of education: Not on file   • Highest education level: Not on file   Occupational History   • Not on file   Tobacco Use   • Smoking status: Never   • Smokeless tobacco: Never   Substance and Sexual Activity   • Alcohol use: Yes     Comment: socially saturday    • Drug use: Never   • Sexual activity: Not on file   Other Topics Concern   • Not on file   Social History Narrative    Current some day smoker - As per [employer]      Social Determinants of Health     Financial Resource Strain: Not on file   Food Insecurity: Not on file   Transportation Needs: Not on file   Physical Activity: Not on file   Stress: Not on file   Social Connections: Not on file   Intimate Partner Violence: Not on file   Housing Stability: Not on file     Social History     Tobacco Use   Smoking Status Never   Smokeless Tobacco Never     Family History   Problem Relation Age of Onset   • Diabetes Father    • Kidney disease Father    • Diabetes Mother    • Kidney disease Maternal Grandfather        The following portions of the patient's history were reviewed and updated as appropriate: allergies, current medications, past medical history, past social history, past surgical history and problem list     Results  No results found for this or any previous visit (from the past 1 hour(s))  ]  No results found for: PSA  Lab Results   Component Value Date    GLUCOSE 131 06/18/2014    CALCIUM 9 2 12/22/2021     06/18/2014    K 4 5 12/22/2021    CO2 29 12/22/2021     12/22/2021    BUN 15 12/22/2021    CREATININE 1 42 (H) 12/22/2021     Lab Results   Component Value Date    WBC 9 65 12/22/2021    HGB 16 4 12/22/2021    HCT 49 1 12/22/2021    MCV 91 12/22/2021     12/22/2021

## 2023-03-02 ENCOUNTER — OFFICE VISIT (OUTPATIENT)
Dept: UROLOGY | Facility: AMBULATORY SURGERY CENTER | Age: 48
End: 2023-03-02

## 2023-03-02 VITALS
DIASTOLIC BLOOD PRESSURE: 88 MMHG | WEIGHT: 222 LBS | BODY MASS INDEX: 31.08 KG/M2 | HEIGHT: 71 IN | SYSTOLIC BLOOD PRESSURE: 118 MMHG

## 2023-03-02 DIAGNOSIS — Z98.52 H/O: VASECTOMY: ICD-10-CM

## 2023-03-02 DIAGNOSIS — Z30.09 VASECTOMY EVALUATION: Primary | ICD-10-CM

## 2023-03-02 NOTE — PROGRESS NOTES
3/2/2023    Almas Fearing  1975  [id number]      Assessment  -Post vasectomy (5/2019)    Discussion/Plan  Samy Martines is a 56 y o  male being managed by Dr Kris Potter      1  Post vasectomy (5/2019)- provided patient with orders for semen analysis testing  He was advised to continue contraception until sterility is confirmed  Plan to call patient with results  Routine prostate cancer screening can begin at age 55 years as he denies any significant risk factors  Call with results of semen analysis testing  He was advised to call office sooner with any additional questions or issues     -All questions answered, patient agrees with plan      History of Present Illness  56 y o  male with a history of vasectomy presents today for follow up  Patient last seen in the office in June 2019  He underwent vasectomy on 5/20/2019  Patient states he did not complete semen analysis testing after vasectomy and presents today for orders to complete testing  He denies any new pregnancies with wife, but they are concerned that they never completed testing as ordered  Patient has no other complaints and denies any scrotal swelling or testicular pain  He denies any lower urinary tract symptoms  Patient denies any strong family history of prostate or testicular malignancy  Review of Systems  Review of Systems   Constitutional: Negative  HENT: Negative  Respiratory: Negative  Cardiovascular: Negative  Gastrointestinal: Negative  Genitourinary: Negative for decreased urine volume, difficulty urinating, dysuria, flank pain, frequency, hematuria, scrotal swelling, testicular pain and urgency  Musculoskeletal: Negative  Skin: Negative  Neurological: Negative  Psychiatric/Behavioral: Negative  Past Medical History  History reviewed  No pertinent past medical history      Past Social History  Past Surgical History:   Procedure Laterality Date   • ABSCESS DRAINAGE      Incision and drainage of skin abscess  Last assessed 6/18/2014    • COLONOSCOPY  2017   • VASECTOMY  05/20/2019   • WISDOM TOOTH EXTRACTION         Past Family History  Family History   Problem Relation Age of Onset   • Diabetes Father    • Kidney disease Father    • Diabetes Mother    • Kidney disease Maternal Grandfather        Past Social history  Social History     Socioeconomic History   • Marital status: /Civil Union     Spouse name: Not on file   • Number of children: Not on file   • Years of education: Not on file   • Highest education level: Not on file   Occupational History   • Not on file   Tobacco Use   • Smoking status: Never   • Smokeless tobacco: Never   Substance and Sexual Activity   • Alcohol use: Yes     Comment: socially saturday    • Drug use: Never   • Sexual activity: Not on file   Other Topics Concern   • Not on file   Social History Narrative    Current some day smoker - As per [facility]      Social Determinants of Health     Financial Resource Strain: Not on file   Food Insecurity: Not on file   Transportation Needs: Not on file   Physical Activity: Not on file   Stress: Not on file   Social Connections: Not on file   Intimate Partner Violence: Not on file   Housing Stability: Not on file       Current Medications  Current Outpatient Medications   Medication Sig Dispense Refill   • ibuprofen (MOTRIN) 600 mg tablet Take 1 tablet (600 mg total) by mouth every 6 (six) hours as needed for mild pain 30 tablet 0   • Multiple Vitamins-Minerals (MENS MULTI VITAMIN & MINERAL PO) Take by mouth     • Omega-3 Fatty Acids (fish oil) 1,000 mg Take 4,000 mg by mouth daily       No current facility-administered medications for this visit  Allergies  No Known Allergies    Past Medical History, Social History, Family History, medications and allergies were reviewed      Vitals  Vitals:    03/02/23 1352   BP: 118/88   BP Location: Left arm   Patient Position: Sitting   Cuff Size: Adult   Weight: 101 kg (222 lb)   Height: 5' 10 75" (1 797 m)       Physical Exam  Physical Exam  Constitutional:       Appearance: Normal appearance  He is well-developed  HENT:      Head: Normocephalic  Eyes:      Pupils: Pupils are equal, round, and reactive to light  Pulmonary:      Effort: Pulmonary effort is normal    Abdominal:      Palpations: Abdomen is soft  Musculoskeletal:         General: Normal range of motion  Cervical back: Normal range of motion  Skin:     General: Skin is warm and dry  Neurological:      General: No focal deficit present  Mental Status: He is alert and oriented to person, place, and time  Psychiatric:         Mood and Affect: Mood normal          Behavior: Behavior normal          Thought Content: Thought content normal          Judgment: Judgment normal          Results    I have personally reviewed all pertinent lab results and reviewed with patient  No results found for: PSA  Lab Results   Component Value Date    GLUCOSE 131 06/18/2014    CALCIUM 9 2 12/22/2021     06/18/2014    K 4 5 12/22/2021    CO2 29 12/22/2021     12/22/2021    BUN 15 12/22/2021    CREATININE 1 42 (H) 12/22/2021     Lab Results   Component Value Date    WBC 9 65 12/22/2021    HGB 16 4 12/22/2021    HCT 49 1 12/22/2021    MCV 91 12/22/2021     12/22/2021     No results found for this or any previous visit (from the past 1 hour(s))

## 2023-03-17 ENCOUNTER — APPOINTMENT (OUTPATIENT)
Dept: LAB | Facility: HOSPITAL | Age: 48
End: 2023-03-17

## 2023-03-17 ENCOUNTER — TELEPHONE (OUTPATIENT)
Dept: UROLOGY | Facility: HOSPITAL | Age: 48
End: 2023-03-17

## 2023-03-17 DIAGNOSIS — Z30.09 VASECTOMY EVALUATION: ICD-10-CM

## 2023-03-17 LAB
DEPRECATED CD4 CELLS/CD8 CELLS BLD: 2 ML
SPERM MOTILE SMN QL MICRO: NORMAL

## 2023-03-17 NOTE — TELEPHONE ENCOUNTER
----- Message from [id number] Almaz Novak sent at 3/17/2023  1:34 PM EDT -----  Please inform patient, no sperm seen on analysis

## 2023-04-06 NOTE — PROGRESS NOTES
Cardiology Consultation     Bernice Gonzalez  [id number]  1975  [facility] CARDIOLOGY [place]  [address] [id number]      1  Precordial pain  POCT ECG    Stress test only, exercise      2  Hypertriglyceridemia        3  Other chest pain  Ambulatory Referral to Cardiology      4  Tobacco use            Discussion/Summary:  Chest pain  -Reports intermittent episodes of left-sided chest discomfort that has been going on for many years, but worsened over the last 2 to 3 years  -Occurs at rest and with exertion  - Will check an exercise stress test to evaluate for ischemia  Hypertriglyceridemia  - Lipid panel 12/22/2021 showed total cholesterol 184, triglycerides 396, HDL 37, LDL 68  -Started on fish oil and flaxseed oil by his PCP  - Also recommended he try supplemental fiber as well as cut back fried foods  Tobacco use  - When he was very young he reports smoking about a pack per day now he only smokes a few cigarettes now and then mostly when drinking  - Encouraged him to cut back    Follow-up in 1 month  History of Present Illness:  Bernice Gonzalez is a 56y o  year old male with a past medical history of hypertriglyceridemia, and tobacco use  He reports feeling well today  For his whole life, he reports having intermittent episodes of chest discomfort which she described as a pain on the left side of his chest that generally last several minutes, but has lasted up to several hours at a time  Reports episodes occur at rest and with exertion and generally are not worse with movement or exertion  Exacerbating or alleviating factors  Denies any episodes of palpitations or other cardiac symptoms  Does also acknowledge some shortness of breath when he is talking for too long  He coaches the basketball team   No prior cardiac history or family history of heart disease  Reports a strong family history of diabetes  Knowledges occasional cigarette use and alcohol use on the weekends    Reports drinking 4-6x 12 ounce beers most weekends and reports smoking cigarettes when he drinks beers  Patient Active Problem List   Diagnosis   • Encounter for sterilization   • Skin neoplasm   • Well adult exam   • Hypertriglyceridemia   • Tick bite   • H/O: vasectomy   • Other chest pain     History reviewed  No pertinent past medical history  Social History     Socioeconomic History   • Marital status: /Civil Union     Spouse name: Not on file   • Number of children: Not on file   • Years of education: Not on file   • Highest education level: Not on file   Occupational History   • Not on file   Tobacco Use   • Smoking status: Never   • Smokeless tobacco: Never   Substance and Sexual Activity   • Alcohol use: Yes     Comment: socially saturday    • Drug use: Never   • Sexual activity: Not on file   Other Topics Concern   • Not on file   Social History Narrative    Current some day smoker - As per [facility]      Social Determinants of Health     Financial Resource Strain: Not on file   Food Insecurity: Not on file   Transportation Needs: Not on file   Physical Activity: Not on file   Stress: Not on file   Social Connections: Not on file   Intimate Partner Violence: Not on file   Housing Stability: Not on file      Family History   Problem Relation Age of Onset   • Diabetes Father    • Kidney disease Father    • Diabetes Mother    • Kidney disease Maternal Grandfather      Past Surgical History:   Procedure Laterality Date   • ABSCESS DRAINAGE      Incision and drainage of skin abscess   Last assessed 6/18/2014    • COLONOSCOPY  2017   • VASECTOMY  05/20/2019   • WISDOM TOOTH EXTRACTION         Current Outpatient Medications:   •  ascorbic acid (VITAMIN C) 500 mg tablet, Take 500 mg by mouth daily, Disp: , Rfl:   •  Flaxseed, Linseed, (FLAXSEED OIL PO), Take by mouth, Disp: , Rfl:   •  ibuprofen (MOTRIN) 600 mg tablet, Take 1 tablet (600 mg total) by mouth every 6 (six) hours as needed for mild pain, Disp: 30 "tablet, Rfl: 0  •  MAGNESIUM PO, Take by mouth, Disp: , Rfl:   •  Multiple Vitamins-Minerals (MENS MULTI VITAMIN & MINERAL PO), Take by mouth, Disp: , Rfl:   •  Omega-3 Fatty Acids (fish oil) 1,000 mg, Take 4,000 mg by mouth daily, Disp: , Rfl:   No Known Allergies      Labs:  Lab Results   Component Value Date    ALT 32 2021    AST 19 2021    BUN 15 2021    CALCIUM 9 2 2021     2021    CHOL 189 2014    CO2 29 2021    CREATININE 1 42 (H) 2021    HDL 37 (L) 2021    HCT 49 1 2021    HGB 16 4 2021     2021    K 4 5 2021     2014    TRIG 396 (H) 2021    WBC 9 65 2021       Imaging: No results found  EC2023: Normal sinus rhythm, normal ECG    Review of Systems:  Review of Systems   Constitutional: Negative for chills, diaphoresis, fatigue and fever  HENT: Negative for congestion  Eyes: Negative for photophobia and visual disturbance  Respiratory: Positive for shortness of breath  Negative for chest tightness  Cardiovascular: Positive for chest pain  Negative for palpitations and leg swelling  Gastrointestinal: Negative for abdominal distention, abdominal pain, diarrhea, nausea and vomiting  Genitourinary: Negative for difficulty urinating and dysuria  Musculoskeletal: Negative for arthralgias, gait problem and joint swelling  Skin: Negative for color change, pallor and rash  Neurological: Negative for dizziness, syncope, numbness and headaches  Psychiatric/Behavioral: Negative for agitation, behavioral problems and confusion           Vitals:    23 1539   BP: 134/68   Pulse: 61      Vitals:    23 1539   Weight: 99 3 kg (219 lb)     Height: 5' 10\" (177 8 cm)     Physical Exam:  General appearance:  Appears stated age, alert, well appearing and in no distress  HEENT:  PERRLA, EOMI, no scleral icterus, no conjunctival pallor  NECK:  Supple, No elevated JVP, no " thyromegaly, no carotid bruits  HEART:  Regular rate and rhythm, normal S1/S2, no S3/S4, no murmur or rub  LUNGS:  Clear to auscultation bilaterally  ABDOMEN:  Soft, non-tender, positive bowel sounds, no rebound or guarding, no organomegaly   EXTREMITIES:  No edema  VASCULAR:  Normal pedal pulses   SKIN: No lesions or rashes on exposed skin  NEURO:  CN II-XII intact, no focal deficits

## 2023-04-07 ENCOUNTER — CONSULT (OUTPATIENT)
Dept: CARDIOLOGY CLINIC | Facility: CLINIC | Age: 48
End: 2023-04-07

## 2023-04-07 VITALS
HEIGHT: 70 IN | WEIGHT: 219 LBS | HEART RATE: 61 BPM | DIASTOLIC BLOOD PRESSURE: 68 MMHG | BODY MASS INDEX: 31.35 KG/M2 | SYSTOLIC BLOOD PRESSURE: 134 MMHG

## 2023-04-07 DIAGNOSIS — R07.2 PRECORDIAL PAIN: Primary | ICD-10-CM

## 2023-04-07 DIAGNOSIS — Z72.0 TOBACCO USE: ICD-10-CM

## 2023-04-07 DIAGNOSIS — R07.89 OTHER CHEST PAIN: ICD-10-CM

## 2023-04-07 DIAGNOSIS — E78.1 HYPERTRIGLYCERIDEMIA: ICD-10-CM

## 2023-04-07 RX ORDER — ASCORBIC ACID 500 MG
500 TABLET ORAL DAILY
COMMUNITY

## 2023-05-23 ENCOUNTER — TELEPHONE (OUTPATIENT)
Dept: CARDIOLOGY CLINIC | Facility: CLINIC | Age: 48
End: 2023-05-23

## 2023-05-23 NOTE — TELEPHONE ENCOUNTER
Jessica, my name is Whitney Martinez  I'm scheduled for a stress test on Friday and I believe I had some special instructions but I can't seem to locate where I have them written or the handouts  If you could please give me a return phone call at area code [phone]  Thank you very much and have a great day

## 2023-05-26 ENCOUNTER — HOSPITAL ENCOUNTER (OUTPATIENT)
Dept: NON INVASIVE DIAGNOSTICS | Facility: CLINIC | Age: 48
Discharge: HOME | End: 2023-05-26
Attending: STUDENT IN AN ORGANIZED HEALTH CARE EDUCATION/TRAINING PROGRAM

## 2023-05-26 VITALS
HEIGHT: 70 IN | BODY MASS INDEX: 31.35 KG/M2 | HEART RATE: 69 BPM | SYSTOLIC BLOOD PRESSURE: 146 MMHG | OXYGEN SATURATION: 96 % | DIASTOLIC BLOOD PRESSURE: 92 MMHG | WEIGHT: 219 LBS

## 2023-05-26 DIAGNOSIS — R07.2 PRECORDIAL PAIN: ICD-10-CM

## 2023-05-26 LAB
CHEST PAIN STATEMENT: NORMAL
MAX DIASTOLIC BP: 100 MMHG
MAX HEART RATE: 157 BPM
MAX HR PERCENT: 90 %
MAX HR: 157 BPM
MAX PREDICTED HEART RATE: 174 BPM
MAX. SYSTOLIC BP: 180 MMHG
PROTOCOL NAME: NORMAL
RATE PRESSURE PRODUCT: NORMAL
REASON FOR TERMINATION: NORMAL
SL CV STRESS RECOVERY BP: NORMAL MMHG
SL CV STRESS RECOVERY HR: 101 BPM
SL CV STRESS RECOVERY O2 SAT: 99 %
SL CV STRESS STAGE REACHED: 4
STRESS ANGINA INDEX: 0
STRESS BASELINE BP: NORMAL MMHG
STRESS BASELINE HR: 69 BPM
STRESS O2 SAT REST: 96 %
STRESS PEAK HR: 155 BPM
STRESS POST ESTIMATED WORKLOAD: 13.7 METS
STRESS POST EXERCISE DUR MIN: 12 MIN
STRESS POST EXERCISE DUR SEC: 0 SEC
STRESS POST O2 SAT PEAK: 99 %
STRESS POST PEAK BP: 180 MMHG
TARGET HR FORMULA: NORMAL
TEST INDICATION: NORMAL
TIME IN EXERCISE PHASE: NORMAL

## 2023-05-31 NOTE — PROGRESS NOTES
Cardiology Consultation     Saman Simpson  [id number]  1975  [facility] CARDIOLOGY [place]  [address] [place] [id number]      1  Precordial pain        2  Hypertriglyceridemia        3  Tobacco use            Discussion/Summary:  Chest pain  -Reports intermittent episodes of left-sided chest discomfort that has been going on for many years, but worsened over the last 2 to 3 years  -Occurs at rest and with exertion  -Exercise stress test 5/26/2023 showed no evidence of inducible ischemia, exercised for 12 minutes and achieved 13 7 METS, resting hypertension  - If symptoms worsen, recommended he call the office to arrange for a Zio patch  Hypertriglyceridemia  - Lipid panel 12/22/2021 showed total cholesterol 184, triglycerides 396, HDL 37, LDL 68  -Started on fish oil and flaxseed oil by his PCP  - Also recommended he try supplemental fiber as well as cut back fried foods  Tobacco use  - When he was very young he reports smoking about a pack per day now he only smokes a few cigarettes now and then mostly when drinking  - Encouraged him to cut back    Follow-up as needed  History of Present Illness:  Saman Simpson is a 56y o  year old male with a past medical history of hypertriglyceridemia, and tobacco use  4/7/2023: He reports feeling well today  For his whole life, he reports having intermittent episodes of chest discomfort which he described as a pain on the left side of his chest that generally last several minutes, but has lasted up to several hours at a time  Reports episodes occur at rest and with exertion and generally are not worse with movement or exertion  Exacerbating or alleviating factors  Denies any episodes of palpitations or other cardiac symptoms  Does also acknowledge some shortness of breath when he is talking for too long  He coaches the basketball team   No prior cardiac history or family history of heart disease    Reports a strong family history of diabetes  Knowledges occasional cigarette use and alcohol use on the weekends  Reports drinking 4-6x 12 ounce beers most weekends and reports smoking cigarettes when he drinks beers  Interval history:  He reports feeling okay today  He continues to have intermittent episodes of left-sided chest discomfort, but no significant change in his symptoms  Otherwise denies any shortness of breath, palpitations, lower extreme edema or other cardiac symptoms at rest or with exertion  Patient Active Problem List   Diagnosis   • Encounter for sterilization   • Skin neoplasm   • Well adult exam   • Hypertriglyceridemia   • Tick bite   • H/O: vasectomy   • Other chest pain     No past medical history on file  Social History     Socioeconomic History   • Marital status: /Civil Union     Spouse name: Not on file   • Number of children: Not on file   • Years of education: Not on file   • Highest education level: Not on file   Occupational History   • Not on file   Tobacco Use   • Smoking status: Never   • Smokeless tobacco: Never   Substance and Sexual Activity   • Alcohol use: Yes     Comment: socially saturday    • Drug use: Never   • Sexual activity: Not on file   Other Topics Concern   • Not on file   Social History Narrative    Current some day smoker - As per [facility]      Social Determinants of Health     Financial Resource Strain: Not on file   Food Insecurity: Not on file   Transportation Needs: Not on file   Physical Activity: Not on file   Stress: Not on file   Social Connections: Not on file   Intimate Partner Violence: Not on file   Housing Stability: Not on file      Family History   Problem Relation Age of Onset   • Diabetes Father    • Kidney disease Father    • Diabetes Mother    • Kidney disease Maternal Grandfather      Past Surgical History:   Procedure Laterality Date   • ABSCESS DRAINAGE      Incision and drainage of skin abscess   Last assessed 6/18/2014    • COLONOSCOPY  2017   • VASECTOMY 2019   • WISDOM TOOTH EXTRACTION         Current Outpatient Medications:   •  ascorbic acid (VITAMIN C) 500 mg tablet, Take 500 mg by mouth daily, Disp: , Rfl:   •  Flaxseed, Linseed, (FLAXSEED OIL PO), Take by mouth, Disp: , Rfl:   •  ibuprofen (MOTRIN) 600 mg tablet, Take 1 tablet (600 mg total) by mouth every 6 (six) hours as needed for mild pain, Disp: 30 tablet, Rfl: 0  •  MAGNESIUM PO, Take by mouth, Disp: , Rfl:   •  Multiple Vitamins-Minerals (MENS MULTI VITAMIN & MINERAL PO), Take by mouth, Disp: , Rfl:   •  Omega-3 Fatty Acids (fish oil) 1,000 mg, Take 4,000 mg by mouth daily, Disp: , Rfl:   No Known Allergies      Labs:  Lab Results   Component Value Date    ALT 32 2021    AST 19 2021    BUN 15 2021    CALCIUM 9 2 2021    CHOL 189 2014     2021    CO2 29 2021    CREATININE 1 42 (H) 2021    HCT 49 1 2021    HDL 37 (L) 2021    HGB 16 4 2021    K 4 5 2021     2014     2021    TRIG 396 (H) 2021    WBC 9 65 2021       Imaging: No results found  EC2023: Normal sinus rhythm, normal ECG    Review of Systems:  Review of Systems   Constitutional: Negative for chills, diaphoresis, fatigue and fever  HENT: Negative for congestion  Eyes: Negative for photophobia and visual disturbance  Respiratory: Positive for shortness of breath  Negative for chest tightness  Cardiovascular: Positive for chest pain  Negative for palpitations and leg swelling  Gastrointestinal: Negative for abdominal distention, abdominal pain, diarrhea, nausea and vomiting  Genitourinary: Negative for difficulty urinating and dysuria  Musculoskeletal: Negative for arthralgias, gait problem and joint swelling  Skin: Negative for color change, pallor and rash  Neurological: Negative for dizziness, syncope, numbness and headaches     Psychiatric/Behavioral: Negative for agitation, behavioral problems and confusion           Vitals:    06/01/23 1146   BP: 118/88   Pulse: 71      Vitals:    06/01/23 1146   Weight: 96 3 kg (212 lb 3 2 oz)           Physical Exam:  General appearance:  Appears stated age, alert, well appearing and in no distress  HEENT:  PERRLA, EOMI, no scleral icterus, no conjunctival pallor  NECK:  Supple, No elevated JVP, no thyromegaly, no carotid bruits  HEART:  Regular rate and rhythm, normal S1/S2, no S3/S4, no murmur or rub  LUNGS:  Clear to auscultation bilaterally  ABDOMEN:  Soft, non-tender, positive bowel sounds, no rebound or guarding, no organomegaly   EXTREMITIES:  No edema  VASCULAR:  Normal pedal pulses   SKIN: No lesions or rashes on exposed skin  NEURO:  CN II-XII intact, no focal deficits

## 2023-06-01 ENCOUNTER — OFFICE VISIT (OUTPATIENT)
Dept: CARDIOLOGY CLINIC | Facility: CLINIC | Age: 48
End: 2023-06-01

## 2023-06-01 VITALS
DIASTOLIC BLOOD PRESSURE: 88 MMHG | HEART RATE: 71 BPM | SYSTOLIC BLOOD PRESSURE: 118 MMHG | BODY MASS INDEX: 30.45 KG/M2 | WEIGHT: 212.2 LBS

## 2023-06-01 DIAGNOSIS — R07.2 PRECORDIAL PAIN: Primary | ICD-10-CM

## 2023-06-01 DIAGNOSIS — Z72.0 TOBACCO USE: ICD-10-CM

## 2023-06-01 DIAGNOSIS — E78.1 HYPERTRIGLYCERIDEMIA: ICD-10-CM

## 2023-06-09 PROCEDURE — 88305 TISSUE EXAM BY PATHOLOGIST: CPT | Performed by: PATHOLOGY

## 2023-06-12 ENCOUNTER — LAB REQUISITION (OUTPATIENT)
Dept: LAB | Facility: HOSPITAL | Age: 48
End: 2023-06-12
Payer: COMMERCIAL

## 2023-06-12 DIAGNOSIS — Z12.11 ENCOUNTER FOR SCREENING FOR MALIGNANT NEOPLASM OF COLON: ICD-10-CM

## 2023-06-16 PROCEDURE — 88305 TISSUE EXAM BY PATHOLOGIST: CPT | Performed by: PATHOLOGY

## 2024-02-21 PROBLEM — Z00.00 WELL ADULT EXAM: Status: RESOLVED | Noted: 2019-09-25 | Resolved: 2024-02-21

## 2024-10-31 ENCOUNTER — OFFICE VISIT (OUTPATIENT)
Dept: URGENT CARE | Age: 49
End: 2024-10-31
Payer: COMMERCIAL

## 2024-10-31 VITALS
BODY MASS INDEX: 27.14 KG/M2 | HEIGHT: 72 IN | WEIGHT: 200.4 LBS | RESPIRATION RATE: 18 BRPM | TEMPERATURE: 97.6 F | SYSTOLIC BLOOD PRESSURE: 136 MMHG | DIASTOLIC BLOOD PRESSURE: 72 MMHG | OXYGEN SATURATION: 98 % | HEART RATE: 102 BPM

## 2024-10-31 DIAGNOSIS — L03.115 CELLULITIS OF RIGHT LOWER EXTREMITY: Primary | ICD-10-CM

## 2024-10-31 PROCEDURE — 99213 OFFICE O/P EST LOW 20 MIN: CPT | Performed by: EMERGENCY MEDICINE

## 2024-10-31 RX ORDER — DOXYCYCLINE 100 MG/1
100 TABLET ORAL 2 TIMES DAILY
Qty: 14 TABLET | Refills: 0 | Status: SHIPPED | OUTPATIENT
Start: 2024-10-31 | End: 2024-11-07

## 2024-10-31 NOTE — PROGRESS NOTES
[facility]        NAME: Prashant Champion is a 49 y.o. male  : 1975    MRN: [id number]  DATE: 2024  TIME: 1:11 PM    Assessment and Plan   Cellulitis of right lower extremity [L03.115]  1. Cellulitis of right lower extremity  doxycycline (ADOXA) 100 MG tablet            Patient Instructions       Follow up with PCP in 3-5 days.  Proceed to  ER if symptoms worsen.    If tests have been performed at [facility], our office will contact you with results if changes need to be made to the care plan discussed with you at the visit.  You can review your full results on [facility].    Chief Complaint     Chief Complaint   Patient presents with    Foot Pain     C/o of right foot pain starting 7 days ago, has hx of lime's disease. Reports feeling similar to the other flares except its not in the joint area, its on the outer edge of foot.          History of Present Illness       Leg Pain   The incident occurred 5 to 7 days ago. There was no injury mechanism. The pain is present in the right foot. The pain is at a severity of 2/10. The pain is mild. The pain has been Fluctuating since onset. Pertinent negatives include no inability to bear weight, loss of motion, loss of sensation, muscle weakness, numbness or tingling.       Review of Systems   Review of Systems   Constitutional:  Negative for activity change, appetite change, chills, diaphoresis, fatigue, fever and unexpected weight change.   HENT:  Negative for congestion, dental problem, drooling, ear discharge, ear pain and sore throat.    Eyes:  Negative for pain and visual disturbance.   Respiratory:  Negative for cough and shortness of breath.    Cardiovascular:  Negative for chest pain and palpitations.   Gastrointestinal:  Negative for abdominal pain, anal bleeding, blood in stool, constipation, diarrhea, nausea, rectal pain and vomiting.   Genitourinary:  Negative for dysuria and hematuria.   Musculoskeletal:  Negative for arthralgias  "and back pain.   Skin:  Negative for color change and rash.   Neurological:  Negative for tingling, seizures, syncope and numbness.   All other systems reviewed and are negative.        Current Medications       Current Outpatient Medications:     doxycycline (ADOXA) 100 MG tablet, Take 1 tablet (100 mg total) by mouth 2 (two) times a day for 7 days, Disp: 14 tablet, Rfl: 0    ascorbic acid (VITAMIN C) 500 mg tablet, Take 500 mg by mouth daily, Disp: , Rfl:     Flaxseed, Linseed, (FLAXSEED OIL PO), Take by mouth, Disp: , Rfl:     ibuprofen (MOTRIN) 600 mg tablet, Take 1 tablet (600 mg total) by mouth every 6 (six) hours as needed for mild pain, Disp: 30 tablet, Rfl: 0    MAGNESIUM PO, Take by mouth, Disp: , Rfl:     Multiple Vitamins-Minerals (MENS MULTI VITAMIN & MINERAL PO), Take by mouth, Disp: , Rfl:     Omega-3 Fatty Acids (fish oil) 1,000 mg, Take 4,000 mg by mouth daily, Disp: , Rfl:     Current Allergies     Allergies as of 10/31/2024    (No Known Allergies)            The following portions of the patient's history were reviewed and updated as appropriate: allergies, current medications, past family history, past medical history, past social history, past surgical history and problem list.     No past medical history on file.    Past Surgical History:   Procedure Laterality Date    ABSCESS DRAINAGE      Incision and drainage of skin abscess. Last assessed 6/18/2014     COLONOSCOPY  2017    VASECTOMY  05/20/2019    WISDOM TOOTH EXTRACTION         Family History   Problem Relation Age of Onset    Diabetes Father     Kidney disease Father     Diabetes Mother     Kidney disease Maternal Grandfather          Medications have been verified.        Objective   /72   Pulse 102   Temp 97.6 °F (36.4 °C) (Tympanic)   Resp 18   Ht 5' 11.5\" (1.816 m)   Wt 90.9 kg (200 lb 6.4 oz)   SpO2 98%   BMI 27.56 kg/m²   No LMP for male patient.       Physical Exam     Physical Exam  Vitals and nursing note reviewed. "   Constitutional:       General: He is not in acute distress.     Appearance: Normal appearance. He is normal weight. He is not ill-appearing, toxic-appearing or diaphoretic.   HENT:      Head: Normocephalic and atraumatic.      Nose: Nose normal. No congestion or rhinorrhea.   Cardiovascular:      Rate and Rhythm: Normal rate and regular rhythm.      Pulses: Normal pulses.      Heart sounds: Normal heart sounds. No murmur heard.     No friction rub. No gallop.   Pulmonary:      Effort: Pulmonary effort is normal.      Breath sounds: Normal breath sounds.   Musculoskeletal:         General: Tenderness present. No swelling, deformity or signs of injury.      Right lower leg: No edema.      Left lower leg: No edema.   Skin:     Capillary Refill: Capillary refill takes less than 2 seconds.      Findings: Erythema present.          Neurological:      General: No focal deficit present.      Mental Status: He is alert.      Sensory: No sensory deficit.      Motor: No weakness.